# Patient Record
Sex: FEMALE | Race: WHITE | URBAN - METROPOLITAN AREA
[De-identification: names, ages, dates, MRNs, and addresses within clinical notes are randomized per-mention and may not be internally consistent; named-entity substitution may affect disease eponyms.]

---

## 2021-01-29 ENCOUNTER — IMMUNIZATION (OUTPATIENT)
Dept: LAB | Facility: HOSPITAL | Age: 56
End: 2021-01-29
Attending: EMERGENCY MEDICINE
Payer: COMMERCIAL

## 2021-01-29 DIAGNOSIS — Z23 NEED FOR VACCINATION: Primary | ICD-10-CM

## 2021-01-29 PROCEDURE — 0011A SARSCOV2 VAC 100MCG/0.5ML IM: CPT

## 2021-02-26 ENCOUNTER — IMMUNIZATION (OUTPATIENT)
Dept: LAB | Facility: HOSPITAL | Age: 56
End: 2021-02-26
Attending: EMERGENCY MEDICINE
Payer: COMMERCIAL

## 2021-02-26 DIAGNOSIS — Z23 NEED FOR VACCINATION: Primary | ICD-10-CM

## 2021-02-26 PROCEDURE — 0012A SARSCOV2 VAC 100MCG/0.5ML IM: CPT

## 2021-07-16 ENCOUNTER — ORDER TRANSCRIPTION (OUTPATIENT)
Dept: SLEEP CENTER | Age: 56
End: 2021-07-16

## 2021-07-16 DIAGNOSIS — I25.9 ISCHEMIC HEART DISEASE: ICD-10-CM

## 2021-07-16 DIAGNOSIS — G47.10 HYPERSOMNIA: ICD-10-CM

## 2021-07-16 DIAGNOSIS — R06.83 SNORING: Primary | ICD-10-CM

## 2021-07-16 DIAGNOSIS — R06.81 APNEA: ICD-10-CM

## 2021-09-09 ENCOUNTER — OFFICE VISIT (OUTPATIENT)
Dept: SLEEP CENTER | Age: 56
End: 2021-09-09
Attending: FAMILY MEDICINE
Payer: COMMERCIAL

## 2021-09-09 DIAGNOSIS — R06.83 SNORING: ICD-10-CM

## 2021-09-09 DIAGNOSIS — I25.9 ISCHEMIC HEART DISEASE: ICD-10-CM

## 2021-09-09 DIAGNOSIS — G47.10 HYPERSOMNIA: ICD-10-CM

## 2021-09-09 DIAGNOSIS — R06.81 APNEA: ICD-10-CM

## 2021-09-09 PROCEDURE — 95806 SLEEP STUDY UNATT&RESP EFFT: CPT

## 2021-09-18 NOTE — PROCEDURES
[address] [facility]  Accredited by the [employer] ([employer])    PATIENT'S NAME: Hailee Patrick   ATTENDING PHYSICIAN: Blanca Linn. Merced Amaro MD   REFERRING PHYSICIAN: [address] [place] [address]  Merced Amaro MD   PATIENT ACCOUNT #: [de-identified] LOCATION: Sleep

## 2022-12-01 ENCOUNTER — HOSPITAL ENCOUNTER (OUTPATIENT)
Dept: BONE DENSITY | Age: 57
Discharge: HOME | End: 2022-12-01
Attending: FAMILY MEDICINE
Payer: COMMERCIAL

## 2022-12-01 ENCOUNTER — HOSPITAL ENCOUNTER (OUTPATIENT)
Dept: MAMMOGRAPHY | Age: 57
Discharge: HOME | End: 2022-12-01
Attending: FAMILY MEDICINE
Payer: COMMERCIAL

## 2022-12-01 DIAGNOSIS — Z12.31 ENCOUNTER FOR SCREENING MAMMOGRAM FOR MALIGNANT NEOPLASM OF BREAST: ICD-10-CM

## 2022-12-01 DIAGNOSIS — Z13.820 ENCOUNTER FOR SCREENING FOR OSTEOPOROSIS: ICD-10-CM

## 2022-12-01 PROCEDURE — 77067 SCR MAMMO BI INCL CAD: CPT | Performed by: FAMILY MEDICINE

## 2022-12-01 PROCEDURE — 77063 BREAST TOMOSYNTHESIS BI: CPT | Performed by: FAMILY MEDICINE

## 2022-12-01 PROCEDURE — 77080 DXA BONE DENSITY AXIAL: CPT | Performed by: FAMILY MEDICINE

## 2023-10-24 ENCOUNTER — ORDER TRANSCRIPTION (OUTPATIENT)
Dept: SLEEP CENTER | Age: 58
End: 2023-10-24

## 2023-10-24 DIAGNOSIS — I25.9 ISCHEMIC HEART DISEASE: ICD-10-CM

## 2023-10-24 DIAGNOSIS — G47.10 HYPERSOMNIA: ICD-10-CM

## 2023-10-24 DIAGNOSIS — R06.83 SNORING: Primary | ICD-10-CM

## 2023-10-24 DIAGNOSIS — R06.81 APNEA: ICD-10-CM

## 2023-12-05 ENCOUNTER — ORDER TRANSCRIPTION (OUTPATIENT)
Dept: SLEEP CENTER | Age: 58
End: 2023-12-05

## 2023-12-05 DIAGNOSIS — G47.10 HYPERSOMNIA: ICD-10-CM

## 2023-12-05 DIAGNOSIS — R06.81 APNEA: ICD-10-CM

## 2023-12-05 DIAGNOSIS — I25.9 ISCHEMIC HEART DISEASE: ICD-10-CM

## 2023-12-05 DIAGNOSIS — R06.83 SNORING: Primary | ICD-10-CM

## 2024-07-23 ENCOUNTER — ORDER TRANSCRIPTION (OUTPATIENT)
Dept: SLEEP CENTER | Age: 59
End: 2024-07-23

## 2024-07-23 DIAGNOSIS — R06.83 SNORING: ICD-10-CM

## 2024-07-23 DIAGNOSIS — G47.33 OBSTRUCTIVE SLEEP APNEA (ADULT) (PEDIATRIC): Primary | ICD-10-CM

## 2024-07-23 DIAGNOSIS — G47.10 HYPERSOMNIA: ICD-10-CM

## 2024-08-20 ENCOUNTER — ORDER TRANSCRIPTION (OUTPATIENT)
Dept: SLEEP CENTER | Age: 59
End: 2024-08-20

## 2024-09-03 ENCOUNTER — OFFICE VISIT (OUTPATIENT)
Dept: SLEEP CENTER | Age: 59
End: 2024-09-03
Payer: COMMERCIAL

## 2024-09-03 DIAGNOSIS — G47.10 HYPERSOMNIA: ICD-10-CM

## 2024-09-03 DIAGNOSIS — G47.33 OBSTRUCTIVE SLEEP APNEA (ADULT) (PEDIATRIC): ICD-10-CM

## 2024-09-03 DIAGNOSIS — R06.83 SNORING: ICD-10-CM

## 2024-09-03 PROCEDURE — 95810 POLYSOM 6/> YRS 4/> PARAM: CPT

## 2024-09-19 ENCOUNTER — HOSPITAL ENCOUNTER (OUTPATIENT)
Dept: MAMMOGRAPHY | Facility: HOSPITAL | Age: 59
Discharge: HOME | End: 2024-09-19
Payer: COMMERCIAL

## 2024-09-19 DIAGNOSIS — Z12.31 ENCOUNTER FOR SCREENING MAMMOGRAM FOR MALIGNANT NEOPLASM OF BREAST: ICD-10-CM

## 2024-09-19 PROCEDURE — 77067 SCR MAMMO BI INCL CAD: CPT

## 2024-09-19 PROCEDURE — 77063 BREAST TOMOSYNTHESIS BI: CPT

## 2024-09-20 ENCOUNTER — ORDER TRANSCRIPTION (OUTPATIENT)
Dept: SLEEP CENTER | Age: 59
End: 2024-09-20

## 2024-09-20 DIAGNOSIS — I25.9 ISCHEMIC HEART DISEASE: ICD-10-CM

## 2024-09-20 DIAGNOSIS — R06.81 APNEA: ICD-10-CM

## 2024-09-20 DIAGNOSIS — G47.10 HYPERSOMNIA: ICD-10-CM

## 2024-09-20 DIAGNOSIS — R06.83 SNORING: Primary | ICD-10-CM

## 2024-10-11 ENCOUNTER — ORDER TRANSCRIPTION (OUTPATIENT)
Dept: SLEEP CENTER | Age: 59
End: 2024-10-11

## 2024-12-17 ENCOUNTER — OFFICE VISIT (OUTPATIENT)
Facility: LOCATION | Age: 59
End: 2024-12-17

## 2024-12-17 DIAGNOSIS — H90.12 CONDUCTIVE HEARING LOSS OF LEFT EAR WITH UNRESTRICTED HEARING OF RIGHT EAR: ICD-10-CM

## 2024-12-17 DIAGNOSIS — H60.392 OTHER INFECTIVE ACUTE OTITIS EXTERNA OF LEFT EAR: Primary | ICD-10-CM

## 2024-12-17 DIAGNOSIS — H92.02 LEFT EAR PAIN: ICD-10-CM

## 2024-12-17 PROCEDURE — 99203 OFFICE O/P NEW LOW 30 MIN: CPT | Performed by: STUDENT IN AN ORGANIZED HEALTH CARE EDUCATION/TRAINING PROGRAM

## 2024-12-17 RX ORDER — CLOTRIMAZOLE 1 G/ML
SOLUTION TOPICAL
Qty: 10 ML | Refills: 0 | Status: SHIPPED | OUTPATIENT
Start: 2024-12-17

## 2024-12-17 RX ORDER — IBUPROFEN 800 MG/1
TABLET, FILM COATED ORAL
COMMUNITY
Start: 2024-11-24

## 2024-12-17 RX ORDER — ROSUVASTATIN CALCIUM 40 MG/1
40 TABLET, COATED ORAL DAILY
COMMUNITY

## 2024-12-17 RX ORDER — OXYBUTYNIN CHLORIDE 10 MG/1
10 TABLET, EXTENDED RELEASE ORAL DAILY
COMMUNITY
Start: 2022-05-01

## 2024-12-17 RX ORDER — METOPROLOL SUCCINATE 25 MG/1
25 TABLET, EXTENDED RELEASE ORAL DAILY
COMMUNITY

## 2024-12-17 RX ORDER — ASPIRIN 81 MG/1
TABLET ORAL
COMMUNITY
Start: 2024-09-04

## 2024-12-17 RX ORDER — LORATADINE 10 MG/1
TABLET ORAL
COMMUNITY
Start: 2024-07-12

## 2024-12-17 RX ORDER — CLOPIDOGREL BISULFATE 75 MG/1
75 TABLET ORAL DAILY
COMMUNITY

## 2024-12-17 NOTE — PROGRESS NOTES
[place]  OTOLARYNGOLOGY - HEAD & NECK SURGERY    12/17/2024     Reason for Consultation:   Left ear pain, fullness    History of Present Illness:   Patient is a pleasant 59 year old female who is being seen for left ear pain and drainage which she had 2 weeks ago.  The patient states that she was seen by her physician and treated with antibiotics as well as antibiotic eardrops.  She states that this did not significantly help her and she started to develop some black discharge from the ear which was partially removed by Dr. Barlow.  She was then referred to see me for further evaluation.  She states that she has been prone to outer ear infections.  She still has some muffled sensation in the left ear as well as a wetness that she feels.  Also has significant pain.  She is prediabetic with last A1c of 6%.  Does frequently wear headphones.  No history of any ear surgery.  Has had some mild vertigo since this is started.    Past Medical History  Past Medical History:    Myocardial infarction (HCC)       Past Surgical History  History reviewed. No pertinent surgical history.    Family History  History reviewed. No pertinent family history.    Social History  Pediatric History   Patient Parents    Not on file     Other Topics Concern    Not on file   Social History Narrative    Not on file           Current Medications:  Current Outpatient Medications   Medication Sig Dispense Refill    aspirin 81 MG Oral Tab EC aspirin 81 mg tablet,delayed release, [RxNorm: 621771]      buPROPion HBr  MG Oral Tablet 24 Hr       ibuprofen 800 MG Oral Tab       Lisinopril 1 MG/ML Oral Solution       loratadine 10 MG Oral Tab       metoprolol succinate ER 25 MG Oral Tablet 24 Hr Take 1 tablet (25 mg total) by mouth daily.      rosuvastatin 40 MG Oral Tab Take 1 tablet (40 mg total) by mouth daily.      oxybutynin ER 10 MG Oral Tablet 24 Hr Take 1 tablet (10 mg total) by mouth daily.      clotrimazole 1 % External Solution Apply to  affected ear 4 drops three times daily. Use for 2 weeks then stop 10 mL 0    clopidogrel 75 MG Oral Tab Take 1 tablet (75 mg total) by mouth daily.         Allergies  Allergies[1]    Review of Systems:   A comprehensive 10 point review of systems was completed.  Pertinent positives and negatives noted in the the HPI.    Physical Exam:   There were no vitals taken for this visit.    GENERAL: No acute distress, Comfortable appearing  FACE: HB 1/6, Normal Animation  HEAD: Normocephalic  EYES: EOMI, pupils equil  EARS: Bilateral Auricles Symmetric  NOSE: Nares patent bilaterally  ORAL CAVITY: Tongue mobile, Oropharynx clear, Floor of mouth clear, Posterior oropharynx normal  NECK: No palpable lymphadenopathy, thyroid not palpable, nontender    OTOMICROSCOPY WITH CERUMEN REMOVAL    Canals:  Right: Canal clear  Left: Canal with cerumen and fungal debris preventing adequate view of TM, debrided with instrumentation as dictated below    Tympanic Membranes:  Right: Normal tympanic membrane.   Left: Significant myringitis noted, injection, intact tympanic membrane    TM Visualized Method:   Right TM examined via otomicroscopy.    Left TM examined via otomicroscopy.      PROCEDURE: REMOVAL OF CERUMEN IMPACTION  The fungal debris and cerumen impaction was completely removed from the left ear canal using microscopy as necessary.   Removal was completed by using a suction        Results:     Laboratory Data:  No results found for: \"WBC\", \"HGB\", \"HCT\", \"PLT\", \"CREATSERUM\", \"BUN\", \"NA\", \"K\", \"CL\", \"CO2\", \"GLU\", \"CA\", \"ALB\", \"ALKPHO\", \"TP\", \"AST\", \"ALT\", \"PTT\", \"INR\", \"PTP\", \"T4F\", \"TSH\", \"TSHREFLEX\", \"[place]\", \"LIP\", \"GGT\", \"PSA\", \"DDIMER\", \"ESRML\", \"ESRPF\", \"CRP\", \"BNP\", \"MG\", \"PHOS\", \"TROP\", \"CK\", \"CKMB\", \"ZAYRA\", \"RPR\", \"B12\", \"ETOH\", \"POCGLU\"      Imaging:  No results found.      Impression:       ICD-10-CM    1. Other infective acute otitis externa of left ear  H60.392       2. Left ear pain  H92.02       3. Conductive hearing loss  of left ear with unrestricted hearing of right ear  H90.12            Recommendations:  I completely debrided the left ear canal there appeared to be some fungal elements.  I applied clotrimazole I would like her to continue clotrimazole 3 times daily over the next 2 weeks.  She will return to see me in 2 weeks for reevaluation.    Thank you for allowing me to participate in the care of your patient.    Riley Park,    Otolaryngology/Rhinology, Sinus, and Endoscopic Skull Base Surgery  [address] [place] [address]  Phone [phone]  Fax [phone]  12/17/2024  11:49 AM  12/17/2024          [1] No Known Allergies

## 2025-01-02 ENCOUNTER — OFFICE VISIT (OUTPATIENT)
Dept: OTOLARYNGOLOGY | Facility: CLINIC | Age: 60
End: 2025-01-02

## 2025-01-02 VITALS — HEIGHT: 60 IN | BODY MASS INDEX: 26.5 KG/M2 | WEIGHT: 135 LBS

## 2025-01-02 DIAGNOSIS — H91.93 BILATERAL HEARING LOSS, UNSPECIFIED HEARING LOSS TYPE: Primary | ICD-10-CM

## 2025-01-02 PROCEDURE — 99213 OFFICE O/P EST LOW 20 MIN: CPT | Performed by: STUDENT IN AN ORGANIZED HEALTH CARE EDUCATION/TRAINING PROGRAM

## 2025-01-02 PROCEDURE — 92504 EAR MICROSCOPY EXAMINATION: CPT | Performed by: STUDENT IN AN ORGANIZED HEALTH CARE EDUCATION/TRAINING PROGRAM

## 2025-01-02 PROCEDURE — 3008F BODY MASS INDEX DOCD: CPT | Performed by: STUDENT IN AN ORGANIZED HEALTH CARE EDUCATION/TRAINING PROGRAM

## 2025-01-02 RX ORDER — BUPROPION HYDROCHLORIDE 150 MG/1
150 TABLET, EXTENDED RELEASE ORAL DAILY
COMMUNITY

## 2025-01-02 NOTE — PROGRESS NOTES
[place]  OTOLARYNGOLOGY - HEAD & NECK SURGERY    1/2/2025     Reason for Consultation:   Left ear pain, fullness    History of Present Illness:   Patient is a pleasant 59 year old female who is being seen for left ear pain and drainage which she had 2 weeks ago.  The patient states that she was seen by her physician and treated with antibiotics as well as antibiotic eardrops.  She states that this did not significantly help her and she started to develop some black discharge from the ear which was partially removed by Dr. Barlow.  She was then referred to see me for further evaluation.  She states that she has been prone to outer ear infections.  She still has some muffled sensation in the left ear as well as a wetness that she feels.  Also has significant pain.  She is prediabetic with last A1c of 6%.  Does frequently wear headphones.  No history of any ear surgery.  Has had some mild vertigo since this is started.    INTERVAL HISTORY  1/2/2025: Patient returns today for reevaluation of her left ear.  She states she does feel better, but still has sensation that she is unable to hear fully.  She does note that she has had some loud noise exposure in the past.  Has not had a recent audiogram.    Past Medical History  Past Medical History:    Myocardial infarction (HCC)       Past Surgical History  No past surgical history on file.    Family History  No family history on file.    Social History  Pediatric History   Patient Parents    Not on file     Other Topics Concern    Not on file   Social History Narrative    Not on file           Current Medications:  Current Outpatient Medications   Medication Sig Dispense Refill    buPROPion  MG Oral Tablet 12 Hr Take 1 tablet (150 mg total) by mouth daily.      aspirin 81 MG Oral Tab EC aspirin 81 mg tablet,delayed release, [RxNorm: 301155]      buPROPion HBr  MG Oral Tablet 24 Hr       ibuprofen 800 MG Oral Tab       Lisinopril 1 MG/ML Oral Solution        loratadine 10 MG Oral Tab       metoprolol succinate ER 25 MG Oral Tablet 24 Hr Take 1 tablet (25 mg total) by mouth daily.      rosuvastatin 40 MG Oral Tab Take 1 tablet (40 mg total) by mouth daily.      oxybutynin ER 10 MG Oral Tablet 24 Hr Take 1 tablet (10 mg total) by mouth daily.      clopidogrel 75 MG Oral Tab Take 1 tablet (75 mg total) by mouth daily.      clotrimazole 1 % External Solution Apply to affected ear 4 drops three times daily. Use for 2 weeks then stop 10 mL 0       Allergies  Allergies[1]    Review of Systems:   A comprehensive 10 point review of systems was completed.  Pertinent positives and negatives noted in the the HPI.    Physical Exam:   Height 5' (1.524 m), weight 135 lb (61.2 kg).    GENERAL: No acute distress, Comfortable appearing  FACE: HB 1/6, Normal Animation  HEAD: Normocephalic  EYES: EOMI, pupils equil  EARS: Bilateral Auricles Symmetric  NOSE: Nares patent bilaterally  ORAL CAVITY: Tongue mobile, Oropharynx clear, Floor of mouth clear, Posterior oropharynx normal  NECK: No palpable lymphadenopathy, thyroid not palpable, nontender    OTOMICROSCOPY     Canals:  Right: Canal clear  Left: Canal clear today with no further fungal debris    Tympanic Membranes:  Right: Normal tympanic membrane.   Left: Very mild myringitis noted, otherwise middle ear space appears clear    TM Visualized Method:   Right TM examined via otomicroscopy.    Left TM examined via otomicroscopy.      PROCEDURE: REMOVAL OF CERUMEN IMPACTION  The fungal debris and cerumen impaction was completely removed from the left ear canal using microscopy as necessary.   Removal was completed by using a suction        Results:     Laboratory Data:  No results found for: \"WBC\", \"HGB\", \"HCT\", \"PLT\", \"CREATSERUM\", \"BUN\", \"NA\", \"K\", \"CL\", \"CO2\", \"GLU\", \"CA\", \"ALB\", \"ALKPHO\", \"TP\", \"AST\", \"ALT\", \"PTT\", \"INR\", \"PTP\", \"T4F\", \"TSH\", \"TSHREFLEX\", \"[place]\", \"LIP\", \"GGT\", \"PSA\", \"DDIMER\", \"ESRML\", \"ESRPF\", \"CRP\", \"BNP\", \"MG\", \"PHOS\",  \"TROP\", \"CK\", \"CKMB\", \"ZAYRA\", \"RPR\", \"B12\", \"ETOH\", \"POCGLU\"      Imaging:  No results found.      Impression:       ICD-10-CM    1. Other infective acute otitis externa of left ear  H60.392       2. Left ear pain  H92.02       3. Conductive hearing loss of left ear with unrestricted hearing of right ear  H90.12            Recommendations:  Patient appears to have improved in terms of her fungal otitis externa.  She can stop the eardrops.  She will return to see me as needed.  I would like to obtain audiogram for her if she is still describing some mild hearing loss.    Thank you for allowing me to participate in the care of your patient.    Riley Park, DO   Otolaryngology/Rhinology, Sinus, and Endoscopic Skull Base Surgery  [facility] [address] [address]  Phone [phone]  Fax [phone]  12/17/2024  11:49 AM  1/2/2025          [1] No Known Allergies